# Patient Record
Sex: MALE | Race: WHITE | NOT HISPANIC OR LATINO | Employment: FULL TIME | URBAN - METROPOLITAN AREA
[De-identification: names, ages, dates, MRNs, and addresses within clinical notes are randomized per-mention and may not be internally consistent; named-entity substitution may affect disease eponyms.]

---

## 2017-07-30 ENCOUNTER — APPOINTMENT (OUTPATIENT)
Dept: LAB | Age: 52
End: 2017-07-30

## 2017-07-30 ENCOUNTER — TRANSCRIBE ORDERS (OUTPATIENT)
Dept: ADMINISTRATIVE | Age: 52
End: 2017-07-30

## 2017-07-30 DIAGNOSIS — Z00.8 HEALTH EXAMINATION IN POPULATION SURVEY: Primary | ICD-10-CM

## 2017-07-30 DIAGNOSIS — Z00.8 HEALTH EXAMINATION IN POPULATION SURVEY: ICD-10-CM

## 2017-07-30 LAB
CHOLEST SERPL-MCNC: 166 MG/DL (ref 50–200)
EST. AVERAGE GLUCOSE BLD GHB EST-MCNC: 120 MG/DL
HBA1C MFR BLD: 5.8 % (ref 4.2–6.3)
HDLC SERPL-MCNC: 57 MG/DL (ref 40–60)
LDLC SERPL CALC-MCNC: 97 MG/DL (ref 0–100)
TRIGL SERPL-MCNC: 62 MG/DL

## 2017-07-30 PROCEDURE — 83036 HEMOGLOBIN GLYCOSYLATED A1C: CPT

## 2017-07-30 PROCEDURE — 80061 LIPID PANEL: CPT

## 2017-07-30 PROCEDURE — 36415 COLL VENOUS BLD VENIPUNCTURE: CPT

## 2018-08-04 ENCOUNTER — APPOINTMENT (OUTPATIENT)
Dept: LAB | Age: 53
End: 2018-08-04

## 2018-08-04 ENCOUNTER — TRANSCRIBE ORDERS (OUTPATIENT)
Dept: ADMINISTRATIVE | Age: 53
End: 2018-08-04

## 2018-08-04 DIAGNOSIS — Z00.8 HEALTH EXAMINATION IN POPULATION SURVEY: Primary | ICD-10-CM

## 2018-08-04 DIAGNOSIS — Z00.8 HEALTH EXAMINATION IN POPULATION SURVEY: ICD-10-CM

## 2018-08-04 LAB
CHOLEST SERPL-MCNC: 165 MG/DL (ref 50–200)
EST. AVERAGE GLUCOSE BLD GHB EST-MCNC: 114 MG/DL
HBA1C MFR BLD: 5.6 % (ref 4.2–6.3)
HDLC SERPL-MCNC: 57 MG/DL (ref 40–60)
LDLC SERPL CALC-MCNC: 90 MG/DL (ref 0–100)
NONHDLC SERPL-MCNC: 108 MG/DL
TRIGL SERPL-MCNC: 88 MG/DL

## 2018-08-04 PROCEDURE — 36415 COLL VENOUS BLD VENIPUNCTURE: CPT

## 2018-08-04 PROCEDURE — 83036 HEMOGLOBIN GLYCOSYLATED A1C: CPT

## 2018-08-04 PROCEDURE — 80061 LIPID PANEL: CPT

## 2019-11-11 ENCOUNTER — APPOINTMENT (EMERGENCY)
Dept: RADIOLOGY | Facility: HOSPITAL | Age: 54
End: 2019-11-11
Payer: OTHER MISCELLANEOUS

## 2019-11-11 ENCOUNTER — HOSPITAL ENCOUNTER (EMERGENCY)
Facility: HOSPITAL | Age: 54
Discharge: HOME | End: 2019-11-11
Admitting: EMERGENCY MEDICINE
Payer: OTHER MISCELLANEOUS

## 2019-11-11 VITALS
RESPIRATION RATE: 14 BRPM | OXYGEN SATURATION: 99 % | SYSTOLIC BLOOD PRESSURE: 128 MMHG | WEIGHT: 185 LBS | HEIGHT: 73 IN | DIASTOLIC BLOOD PRESSURE: 69 MMHG | TEMPERATURE: 98.3 F | HEART RATE: 94 BPM | BODY MASS INDEX: 24.52 KG/M2

## 2019-11-11 DIAGNOSIS — S06.5X9A SDH (SUBDURAL HEMATOMA) (HCC): Primary | ICD-10-CM
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
## 2019-11-11 PROBLEM — S06.5XAA SDH (SUBDURAL HEMATOMA) (HCC): Status: ACTIVE | Noted: 2019-11-11

## 2019-11-11 LAB
BASE EXCESS BLDA CALC-SCNC: 5 MMOL/L (ref -2–3)
CA-I BLD-SCNC: 1.16 MMOL/L (ref 1.12–1.32)
GLUCOSE SERPL-MCNC: 100 MG/DL (ref 65–140)
HCO3 BLDA-SCNC: 29.7 MMOL/L (ref 24–30)
HCT VFR BLD CALC: 45 % (ref 36.5–49.3)
HGB BLDA-MCNC: 15.3 G/DL (ref 12–17)
PCO2 BLD: 31 MMOL/L (ref 21–32)
PCO2 BLD: 44 MM HG (ref 42–50)
PH BLD: 7.44 [PH] (ref 7.3–7.4)
PO2 BLD: 32 MM HG (ref 35–45)
POTASSIUM BLD-SCNC: 3.9 MMOL/L (ref 3.5–5.3)
SAO2 % BLD FROM PO2: 64 % (ref 60–85)
SODIUM BLD-SCNC: 139 MMOL/L (ref 136–145)
SPECIMEN SOURCE: ABNORMAL

## 2019-11-11 PROCEDURE — 82803 BLOOD GASES ANY COMBINATION: CPT

## 2019-11-11 PROCEDURE — 71045 X-RAY EXAM CHEST 1 VIEW: CPT

## 2019-11-11 PROCEDURE — 82947 ASSAY GLUCOSE BLOOD QUANT: CPT

## 2019-11-11 PROCEDURE — 85014 HEMATOCRIT: CPT

## 2019-11-11 PROCEDURE — 73130 X-RAY EXAM OF HAND: CPT

## 2019-11-11 PROCEDURE — 84295 ASSAY OF SERUM SODIUM: CPT

## 2019-11-11 PROCEDURE — 99284 EMERGENCY DEPT VISIT MOD MDM: CPT | Performed by: SURGERY

## 2019-11-11 PROCEDURE — NC001 PR NO CHARGE: Performed by: SURGERY

## 2019-11-11 PROCEDURE — 82330 ASSAY OF CALCIUM: CPT

## 2019-11-11 PROCEDURE — 99244 OFF/OP CNSLTJ NEW/EST MOD 40: CPT | Performed by: PHYSICIAN ASSISTANT

## 2019-11-11 PROCEDURE — 84132 ASSAY OF SERUM POTASSIUM: CPT

## 2019-11-11 PROCEDURE — NC001 PR NO CHARGE: Performed by: EMERGENCY MEDICINE

## 2019-11-11 PROCEDURE — 70450 CT HEAD/BRAIN W/O DYE: CPT

## 2019-11-11 PROCEDURE — 99285 EMERGENCY DEPT VISIT HI MDM: CPT

## 2019-11-11 RX ORDER — ACETAMINOPHEN 325 MG/1
975 TABLET ORAL EVERY 8 HOURS PRN
Status: DISCONTINUED | OUTPATIENT
Start: 2019-11-11 | End: 2019-11-11 | Stop reason: HOSPADM

## 2019-11-11 RX ORDER — ASPIRIN 81 MG/1
81 TABLET, CHEWABLE ORAL DAILY
COMMUNITY
End: 2019-11-11 | Stop reason: HOSPADM

## 2019-11-11 RX ORDER — METHOCARBAMOL 500 MG/1
500 TABLET, FILM COATED ORAL EVERY 6 HOURS PRN
Status: DISCONTINUED | OUTPATIENT
Start: 2019-11-11 | End: 2019-11-11 | Stop reason: HOSPADM

## 2019-11-11 RX ADMIN — ACETAMINOPHEN 975 MG: 325 TABLET ORAL at 11:10

## 2019-11-11 RX ADMIN — METHOCARBAMOL TABLETS 500 MG: 500 TABLET, COATED ORAL at 11:10
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
## 2019-11-11 NOTE — DISCHARGE INSTRUCTIONS
Discharge Instructions - Neurosurgery    · Do not take any blood thinning medications (ie  No Advil  No motrin  No ibuprofen  No Aleve  No Aspirin  No fishoil  No heparin  No antiplatelet / no anticoagulation medication)  - may resume aspirin after 2 weeks  · Refrain from activity that increases chance of trauma to head or falls  Recommend you take fall precaution  · No strenuous activity or sports  · Return to hospital Emergency Room if you experience worsening / new headache, nausea/vomiting, speech/vision change, seizure, confusion / mental status change, weakness, or other neurological changes
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
## 2019-11-11 NOTE — INCIDENTAL FINDINGS
The following findings require follow up:  Radiographic finding   Finding: There is polypoid mucosal thickening of the maxillary sinuses as well as ethmoid air cells  Follow up required: routine follow-up with PCP   Follow up should be done within 4-6 week(s)    Please notify the following clinician to assist with the follow up:    With your primary care provider

## 2019-11-13 ENCOUNTER — TELEPHONE (OUTPATIENT)
Dept: NEUROSURGERY | Facility: CLINIC | Age: 54
End: 2019-11-13
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
## 2019-11-19 ENCOUNTER — TELEPHONE (OUTPATIENT)
Dept: SURGERY | Facility: CLINIC | Age: 54
End: 2019-11-19
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
## 2019-11-20 ENCOUNTER — OFFICE VISIT (OUTPATIENT)
Dept: NEUROSURGERY | Facility: CLINIC | Age: 54
End: 2019-11-20
Payer: OTHER MISCELLANEOUS

## 2019-11-20 ENCOUNTER — HOSPITAL ENCOUNTER (OUTPATIENT)
Dept: CT IMAGING | Facility: HOSPITAL | Age: 54
Discharge: HOME | End: 2019-11-20
Payer: OTHER MISCELLANEOUS

## 2019-11-20 ENCOUNTER — TELEPHONE (OUTPATIENT)
Dept: NEUROSURGERY | Facility: CLINIC | Age: 54
End: 2019-11-20

## 2019-11-20 VITALS
HEART RATE: 81 BPM | SYSTOLIC BLOOD PRESSURE: 122 MMHG | BODY MASS INDEX: 25.39 KG/M2 | DIASTOLIC BLOOD PRESSURE: 88 MMHG | TEMPERATURE: 98.3 F | WEIGHT: 191.6 LBS | HEIGHT: 73 IN

## 2019-11-20 DIAGNOSIS — S06.5X9A SDH (SUBDURAL HEMATOMA) (HCC): ICD-10-CM

## 2019-11-20 DIAGNOSIS — S06.9X0S TRAUMATIC BRAIN INJURY, WITHOUT LOSS OF CONSCIOUSNESS, SEQUELA (HCC): ICD-10-CM
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
## 2019-11-20 DIAGNOSIS — S06.5X9A SDH (SUBDURAL HEMATOMA) (HCC): Primary | ICD-10-CM

## 2019-11-20 PROCEDURE — 70450 CT HEAD/BRAIN W/O DYE: CPT

## 2019-11-20 PROCEDURE — 99213 OFFICE O/P EST LOW 20 MIN: CPT | Performed by: PHYSICIAN ASSISTANT

## 2019-11-20 RX ORDER — ACETAMINOPHEN 325 MG/1
650 TABLET ORAL AS NEEDED
COMMUNITY

## 2019-11-20 RX ORDER — MULTIVITAMIN
1 TABLET ORAL DAILY
COMMUNITY
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
## 2019-11-20 NOTE — TELEPHONE ENCOUNTER
Called patients' wife explained the f/u plan  She stated he would be available to go anywhere for CT Head (preferred [facility] / [address] [place] [address])  Called central scheduling  Patient scheduled for 2:00pm today at [address] [place] [address] with a f/u in the office at 245pm with [address]  Rosanna Oro was extremely appreciative of the assistance
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
## 2019-11-26 ENCOUNTER — TELEPHONE (OUTPATIENT)
Dept: NEUROLOGY | Facility: CLINIC | Age: 54
End: 2019-11-26

## 2019-11-26 NOTE — TELEPHONE ENCOUNTER
Spoke to pt in reference to rescheduling his 12/11 appointment to 12/3  As per pt he wants to keep his appointment as is

## 2019-12-03 ENCOUNTER — TELEPHONE (OUTPATIENT)
Dept: NEUROLOGY | Facility: CLINIC | Age: 54
End: 2019-12-03

## 2019-12-10 ENCOUNTER — TELEPHONE (OUTPATIENT)
Dept: NEUROLOGY | Facility: CLINIC | Age: 54
End: 2019-12-10
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
## 2019-12-11 ENCOUNTER — CONSULT (OUTPATIENT)
Dept: NEUROLOGY | Facility: CLINIC | Age: 54
End: 2019-12-11
Payer: OTHER MISCELLANEOUS

## 2019-12-11 VITALS
DIASTOLIC BLOOD PRESSURE: 84 MMHG | HEIGHT: 73 IN | HEART RATE: 80 BPM | BODY MASS INDEX: 25.79 KG/M2 | RESPIRATION RATE: 16 BRPM | WEIGHT: 194.6 LBS | SYSTOLIC BLOOD PRESSURE: 129 MMHG
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
## 2019-12-11 DIAGNOSIS — S06.9X0D MILD TRAUMATIC BRAIN INJURY, WITHOUT LOSS OF CONSCIOUSNESS, SUBSEQUENT ENCOUNTER: Primary | ICD-10-CM

## 2019-12-11 DIAGNOSIS — G44.319 ACUTE POST-TRAUMATIC HEADACHE, NOT INTRACTABLE: ICD-10-CM

## 2019-12-11 DIAGNOSIS — S06.5X9A SDH (SUBDURAL HEMATOMA) (HCC): ICD-10-CM

## 2019-12-11 DIAGNOSIS — S04.32XA INJURY OF LEFT TRIGEMINAL NERVE, INITIAL ENCOUNTER: ICD-10-CM

## 2019-12-11 PROCEDURE — 99244 OFF/OP CNSLTJ NEW/EST MOD 40: CPT | Performed by: PSYCHIATRY & NEUROLOGY

## 2019-12-11 NOTE — PROGRESS NOTES
Review of Systems   Constitutional: Negative  Negative for appetite change and fever  HENT: Positive for tinnitus  Negative for hearing loss, trouble swallowing and voice change  Eyes: Positive for visual disturbance (sensitivity to light)  Negative for photophobia and pain  Respiratory: Negative  Negative for shortness of breath  Cardiovascular: Negative  Negative for palpitations  Gastrointestinal: Negative  Negative for nausea and vomiting  Endocrine: Negative  Negative for cold intolerance and heat intolerance  Genitourinary: Negative  Negative for dysuria, frequency and urgency  Musculoskeletal: Positive for gait problem (Balance issues)  Negative for myalgias and neck pain  Skin: Negative  Negative for rash  Allergic/Immunologic: Negative  Neurological: Positive for headaches (Pressure, feels like a hangover)  Negative for dizziness, tremors, seizures, syncope, facial asymmetry, speech difficulty, weakness, light-headedness and numbness  Memory issues   Hematological: Negative  Does not bruise/bleed easily  Psychiatric/Behavioral: Positive for agitation and confusion  Negative for hallucinations and sleep disturbance  The patient is nervous/anxious           Mood swings

## 2019-12-11 NOTE — PATIENT INSTRUCTIONS
Follow up with a primary doctor for routine health screen     Already returned to work without issues     Activity Plan:  Continue to gradually return to your normal physical activity    Headache/migraine treatment:   Abortive medications (for immediate treatment of a headache): Ok to take ibuprofen or acetaminophen for headaches, but try to limit the amount and frequency that you are taking to avoid medication overuse/rebound headache   - try to take no more than 3 days per week     Prescription preventive medications for headaches/migraines   (to take every day to help prevent headaches - not to take at the time of headache): Not indicated at this time     Lifestyle Recommendations:  - Maintain good sleep hygiene  Going to bed and waking up at consistent times, avoiding excessive daytime naps, avoiding caffeinated beverages in the evening, avoid excessive stimulation in the evening and generally using bed primarily for sleeping  One hour before bedtime would recommend turning lights down lower, decreasing your activity (may read quietly, listen to music at a low volume)  When you get into bed, should eliminate all technology (no texting, emailing, playing with your phone, iPad or tablet in bed)  - Maintain good hydration  Drink  2L of fluid a day (4 typical small water bottles)  - Maintain good nutrition  In particular don't skip meals and eat balanced meals regularly  Education and Follow-up  - Please contact us if any questions or concerns arise  Of course, try to protect yourself from head injuries, and if any new concerning symptoms or significant blow to the head or body go to the emergency department    - Follow up if needed

## 2021-01-18 PROBLEM — Z12.5 PROSTATE CANCER SCREENING ENCOUNTER, OPTIONS AND RISKS DISCUSSED: Status: ACTIVE | Noted: 2021-01-18
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
## 2021-01-18 NOTE — PATIENT INSTRUCTIONS
PROSTATE CANCER SCREENING OVERVIEW    Prostate cancer screening involves testing for prostate cancer in men who have no symptoms of the disease  This testing can find cancer at an early stage  However, medical experts agree that prostate cancer screening should not be routinely ordered for all men and that screening can lead to both benefits and harms  This article is designed to review the advantages and disadvantages of prostate cancer screening  You should talk with your health care provider to decide what is best in your individual situation  WHAT IS PROSTATE CANCER? Prostate cancer is a cancer of the prostate, a small gland in men that is located below the bladder and in front of the rectum (figure 1)  The prostate produces fluid that helps carry sperm during ejaculation  Although many men are diagnosed with prostate cancer, most of them do not die from their cancer  Prostate cancer often grows so slowly that many men die of other causes before they even develop symptoms of prostate cancer  PROSTATE CANCER RISK FACTORS  Age -- All men are at risk for prostate cancer, but the risk greatly increases with older age  Prostate cancer is rarely found in men younger than 48years old  Ethnic background --  men develop prostate cancer more often than white and  men   men also are more likely to die of prostate cancer than white or  men  Family medical history -- Men who have a first-degree relative (a father or brother) with prostate cancer are more likely to develop the disease  Men with female relatives with breast cancer related to the breast cancer gene (BRCA) may also be more likely to develop prostate cancer  Diet -- A diet high in animal fat or low in vegetables may increase a man's risk of prostate cancer      PROSTATE CANCER SCREENING TESTS  Prostate cancer screening involves blood test that measures prostate-specific antigen (PSA)  Prostate-specific antigen (PSA) -- PSA is a protein produced by the prostate  The PSA test measures the amount of PSA in a sample of blood  Although many men with prostate cancer have an elevated PSA concentration, a high level does not necessarily mean there is a cancer  The most common cause for an elevated PSA is benign prostatic hyperplasia (BPH), a noncancerous enlargement of the prostate  Other causes include prostate infection (prostatitis), trauma (bicycle riding), and sexual activity  You should avoid ejaculating or riding a bike for at least 48 hours before having a PSA test  (See "Patient education: Benign prostatic hyperplasia (BPH) (Beyond the Basics)"  )    Rectal examination -- A rectal examination is sometimes recommended, along with measurement of the PSA, to screen for prostate cancer  However, studies have not shown that rectal examination is an effective screening test for prostate cancer when used alone  If the PSA test is positive -- A positive PSA test is not a reason to panic; noncancerous conditions are the most common causes for an abnormal test, particularly for PSA tests  On the other hand, a positive test should not be ignored  The first step in evaluating an elevated PSA is usually to repeat the test   You should avoid ejaculating and riding a bike for at least 48 hours before repeating the test  If the PSA remains elevated, a prostate biopsy or other testing is usually recommended  Prostate biopsy -- A prostate biopsy involves having a rectal ultrasound and use of a needle to obtain tissue samples from the prostate gland  The biopsy is usually performed in the office by a urologist (a doctor who specializes in treatment of urinary, bladder, and prostate issues)  After the procedure, most men feel sore and you may see some blood in the urine or semen, this can last for up to 4-6 weeks  Biopsies can rarely cause serious infections   Sometimes biopsies are guided by magnetic resonance imaging (MRI)  PROS AND CONS OF PROSTATE CANCER SCREENING    There are a number of arguments for and against prostate cancer screening  Arguments for screening -- Experts in favor of prostate cancer screening cite the following arguments:    ?Results from a large  study of prostate cancer screening found that men who had prostate-specific antigen (PSA) testing had a 20 percent lower chance of dying from prostate cancer after 13 years compared with men who did not have prostate cancer screening [1]  Men who had PSA testing also had a 30 percent lower chance of developing metastatic disease (cancer that has spread to other parts of the body) [2]  In another  study of prostate cancer screening, the mortality benefit was even larger to prostate cancer screening  (the [place] trial)    ? A substantial number of men die from prostate cancer every year and many more suffer from the complications of advanced disease  ? For men with an aggressive prostate cancer, the best chance for curing it is by finding it at an early stage and then treating it with surgery or radiation  Studies have shown that men who have prostate cancer detected by PSA screening tend to have earlier-stage cancer than men who have a cancer detected by other means  (See "Patient education: Treatment for advanced prostate cancer (Beyond the Basics)" and "Patient education: Prostate cancer treatment; stage I to III cancer (Beyond the Basics)" )    ? The five-year survival for men who have prostate cancer confined to the prostate gland (early stage) is nearly 100 percent; this drops to 27 percent for men whose cancer has spread to other areas of the body  However, many early-stage cancers are not aggressive, and the five-year survival for those will be nearly 100 percent even without any treatment [3]  ?The available screening tests are not perfect, but they are easy to perform and have fair accuracy    Arguments against screening -- Other arguments have also been made against screening:    ?Even though the  study found a benefit of prostate cancer screening, PSA testing prevented only about one prostate cancer death for every 1000 screened men after 13 years [1]  Furthermore, 76 percent of men with an abnormal PSA who had a prostate biopsy did not have prostate cancer  However, in the [place] study, the number needed to screen and treat was much lower indicating that prostate cancer screening is ineffective screening measure which decreases the morbidity and mortality of prostate cancer  ?Many prostate cancers detected with screening are unlikely to cause death or disability  Thus, a number of men will be diagnosed with cancer and potentially suffer the side effects of cancer treatment for cancers that never would have been found without prostate cancer screening  In other words, even if screening finds a cancer early, it is not clear in all cases that the cancer must be treated  IS PROSTATE CANCER SCREENING RIGHT FOR ME? The answer to this question is not the same for everyone  The table includes some questions you can ask yourself when weighing the potential risk and benefits of screening (table 1)  Professional organizations -- Major medical associations and societies, including the [employer] Task Force [4], American Cancer Society [5], American Urological Association [6], and many  cancer societies, agree that men should discuss screening with their health care providers  Men should be informed about the benefits and risks of prostate cancer screening and treatment and make decisions that best reflect their personal values and preferences  Age to first consider screening -- Screening discussions should begin at age 48 years for men at average risk for developing prostate cancer   Men with risk factors for prostate cancer (such as black men or men with a father or brother who had prostate cancer) may want to begin screening discussions at age 36 to 39 years  How often to perform screening -- Once screening begins, it should occur every two to four years (if continued testing is desired) and should include a PSA blood test   Age to stop screening -- Guidelines suggest stopping screening after age 71, though some experts would continue offering screening to very healthy men beyond that age  Screening not recommended -- Screening is generally not recommended for men whose life expectancy, or ability to undergo curative treatment, is limited by serious health problems  In these situations, the potential benefits of screening are outweighed by the likely harms  WHERE TO GET MORE INFORMATION  Your healthcare provider is the best source of information for questions and concerns related to your medical problem  This article will be updated as needed on our web site (www [employer]/patients)  Related topics for patients, as well as selected articles written for healthcare professionals, are also available  Some of the most relevant are listed below  Patient level information -- [employer] offers two types of patient education materials  The Basics -- The Basics patient education pieces answer the four or five key questions a patient might have about a given condition  These articles are best for patients who want a general overview and who prefer short, easy-to-read materials  Patient education: Prostate cancer screening (PSA tests) (The Basics)  Patient education: Prostate cancer (The Basics)  Patient education: Cancer screening (The Basics)  Patient education: Choosing treatment for low-risk localized prostate cancer (The Basics)  Beyond the Basics -- Beyond the Basics patient education pieces are longer, more sophisticated, and more detailed  These articles are best for patients who want in-depth information and are comfortable with some medical jargon    Patient education: Treatment for advanced prostate cancer (Beyond the Basics)  Patient education: Prostate cancer treatment; stage I to III cancer (Beyond the Basics)  Patient education: Benign prostatic hyperplasia (BPH) (Beyond the Basics)  Professional level information -- Professional level articles are designed to keep doctors and other health professionals up-to-date on the latest medical findings  These articles are thorough, long, and complex, and they contain multiple references to the research on which they are based  Professional level articles are best for people who are comfortable with a lot of medical terminology and who want to read the same materials their doctors are reading  Measurement of prostate-specific antigen  Screening for prostate cancer  The following organizations also provide reliable health information  ? HANNA Martinez, Gisselle  0-772-5-CANCER  (www cancer gov/types/prostate)  ? American Society for Clinical Oncology (patient information website)  (www cancer  net)  ? [address] [place] [address] (patient and caregiver information website)  (www nccn org/patients)  ? [address]  4-179-MTQ-2345  ([facility]  org)  ? [employer] of Medicine  (www [employer] gov/healthtopics  html)  ? [employer] Prostate Cancer Education and Support  (www ustoo  org/Detection-PSA-And-ROSY)

## 2021-01-19 ENCOUNTER — LAB (OUTPATIENT)
Dept: LAB | Facility: AMBULARY SURGERY CENTER | Age: 56
End: 2021-01-19
Attending: UROLOGY
Payer: COMMERCIAL

## 2021-01-19 ENCOUNTER — TELEPHONE (OUTPATIENT)
Dept: UROLOGY | Facility: CLINIC | Age: 56
End: 2021-01-19

## 2021-01-19 ENCOUNTER — OFFICE VISIT (OUTPATIENT)
Dept: UROLOGY | Facility: CLINIC | Age: 56
End: 2021-01-19
Payer: COMMERCIAL

## 2021-01-19 VITALS
BODY MASS INDEX: 24.28 KG/M2 | DIASTOLIC BLOOD PRESSURE: 80 MMHG | WEIGHT: 183.2 LBS | HEIGHT: 73 IN | SYSTOLIC BLOOD PRESSURE: 128 MMHG | HEART RATE: 73 BPM

## 2021-01-19 DIAGNOSIS — Z12.5 PROSTATE CANCER SCREENING ENCOUNTER, OPTIONS AND RISKS DISCUSSED: Primary | ICD-10-CM

## 2021-01-19 DIAGNOSIS — R31.0 GROSS HEMATURIA: ICD-10-CM

## 2021-01-19 DIAGNOSIS — R39.9 LOWER URINARY TRACT SYMPTOMS (LUTS): ICD-10-CM

## 2021-01-19 DIAGNOSIS — I86.1 SCROTAL VARICES: ICD-10-CM

## 2021-01-19 LAB — PSA SERPL-MCNC: 1.5 NG/ML (ref 0–4)

## 2021-01-19 PROCEDURE — 99204 OFFICE O/P NEW MOD 45 MIN: CPT | Performed by: UROLOGY

## 2021-01-19 PROCEDURE — 84153 ASSAY OF PSA TOTAL: CPT

## 2021-01-19 RX ORDER — ASPIRIN 81 MG/1
81 TABLET ORAL DAILY
COMMUNITY

## 2021-01-19 NOTE — TELEPHONE ENCOUNTER
I did try to call the patient to give him his PSA results which have resulted at 1 5, low risk for prostate cancer combined with his examination  He did not answer the telephone  I asked him to please call our office should he wish to have these results  He can see us back on a p r n   Basis

## 2021-01-20 NOTE — TELEPHONE ENCOUNTER
Attempted to call patient at this time, no answer  Per communication consent detailed message left    Advised Dr Ofelia Hinton reviewed PSA , PSA resulted at 1 5 well within normal range, patient can follow up on an as needed basis unless he has any urological concerns or complaints   Left office number for call back

## 2021-07-07 ENCOUNTER — TELEPHONE (OUTPATIENT)
Dept: UROLOGY | Facility: AMBULATORY SURGERY CENTER | Age: 56
End: 2021-07-07

## 2021-07-07 NOTE — TELEPHONE ENCOUNTER
Patient managed by Lisa Ram is calling to say he was seen in emergency room yesterday and told he has a 5 mm stone

## 2021-07-07 NOTE — TELEPHONE ENCOUNTER
CT scan done 7/6/21 shows 5mm distal right ureter calculus  Please schedule patient for AP follow up within 7-10 days, can use same day/next day  He should bring disc with images from our of state CT with him to appt

## 2021-07-07 NOTE — TELEPHONE ENCOUNTER
Patient was seen in [place] ER at [facility]  Patient was advised to contact hospital for records   Patient will fax records over to [phone]

## 2021-07-08 NOTE — TELEPHONE ENCOUNTER
Patient requesting first opening as he is out of medications  Offered 07/09 with Mira Oliveros    Patient accepted

## 2022-10-12 PROBLEM — Z12.5 PROSTATE CANCER SCREENING ENCOUNTER, OPTIONS AND RISKS DISCUSSED: Status: RESOLVED | Noted: 2021-01-18 | Resolved: 2022-10-12

## 2023-05-22 PROBLEM — G47.33 OSA (OBSTRUCTIVE SLEEP APNEA): Status: ACTIVE | Noted: 2023-05-22
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
## 2025-06-23 ENCOUNTER — TELEPHONE (OUTPATIENT)
Dept: GASTROENTEROLOGY | Facility: CLINIC | Age: 60
End: 2025-06-23

## 2025-06-23 ENCOUNTER — OFFICE VISIT (OUTPATIENT)
Dept: GASTROENTEROLOGY | Facility: CLINIC | Age: 60
End: 2025-06-23
Payer: COMMERCIAL

## 2025-06-23 VITALS
WEIGHT: 174.4 LBS | HEART RATE: 75 BPM | BODY MASS INDEX: 23.11 KG/M2 | HEIGHT: 73 IN | DIASTOLIC BLOOD PRESSURE: 67 MMHG | SYSTOLIC BLOOD PRESSURE: 103 MMHG

## 2025-06-23 DIAGNOSIS — R14.0 BLOATING: ICD-10-CM

## 2025-06-23 DIAGNOSIS — R17 SERUM TOTAL BILIRUBIN ELEVATED: ICD-10-CM

## 2025-06-23 DIAGNOSIS — R10.12 LUQ PAIN: ICD-10-CM

## 2025-06-23 DIAGNOSIS — R10.13 EPIGASTRIC PAIN: ICD-10-CM

## 2025-06-23 DIAGNOSIS — R19.4 CHANGE IN BOWEL HABIT: Primary | ICD-10-CM

## 2025-06-23 DIAGNOSIS — R63.4 WEIGHT LOSS, ABNORMAL: ICD-10-CM

## 2025-06-23 PROCEDURE — 99204 OFFICE O/P NEW MOD 45 MIN: CPT | Performed by: NURSE PRACTITIONER

## 2025-06-23 RX ORDER — SODIUM CHLORIDE, SODIUM LACTATE, POTASSIUM CHLORIDE, CALCIUM CHLORIDE 600; 310; 30; 20 MG/100ML; MG/100ML; MG/100ML; MG/100ML
125 INJECTION, SOLUTION INTRAVENOUS CONTINUOUS
OUTPATIENT
Start: 2025-06-23

## 2025-06-23 RX ORDER — TADALAFIL 5 MG/1
5 TABLET ORAL DAILY PRN
COMMUNITY
Start: 2025-04-03 | End: 2025-08-01

## 2025-06-23 RX ORDER — PANTOPRAZOLE SODIUM 20 MG/1
20 TABLET, DELAYED RELEASE ORAL DAILY
Qty: 30 TABLET | Refills: 1 | Status: SHIPPED | OUTPATIENT
Start: 2025-06-23

## 2025-06-23 NOTE — PROGRESS NOTES
Name: Nick Call      : 1965      MRN: [id number]  Encounter Provider: ESPERANZA Morgan  Encounter Date: 2025   Encounter department: [facility] GASTROENTEROLOGY [place] [address]  :  Assessment & Plan  Change in bowel habit  Bloating  Weight loss, abnormal  Epigastric pain  LUQ pain  Pt reports diarrhea with small quantities of loose stool several times a day, bloating/flatulence, weight loss of 10 lbs over the last 5-6 weeks and more recently has been having epigastric/left upper quadrant pain as well.  Denies any NSAID use except for daily aspirin or recent antibiotic use. Comprehensive stool panel done by PCP  was negative.  Recent CBC normal, CMP notable for just elevated total bilirubin 1.1, TSH done back in February was normal.  His last colonoscopy was 30+ years ago and he thinks this was normal except for hemorrhoids.    -Obtain x-ray KUB to evaluate for overflow diarrhea given moderate stool burden on prior CAT scan in April  -Check fecal elastase, fecal calprotectin, celiac disease panel  -Recommend continuing Metamucil fiber supplement which she started yesterday  -Continue bland diet, stay well-hydrated  - EGD/colonoscopy scheduled for further evaluation.  Prep and procedure explained  -Will start low-dose PPI with Protonix 20 mg daily in the interim due to epigastric/left upper quadrant pain to treat any underlying gastritis/PUD  - Consider contrast-enhanced CAT scan abdomen pelvis pending course          Orders:    Colonoscopy; Future    EGD; Future    XR abdomen 1 view kub; Future    Celiac Panel/Adult; Future    Pancreatic elastase, fecal; Future    Calprotectin,Fecal; Future    pantoprazole (PROTONIX) 20 mg tablet; Take 1 tablet (20 mg total) by mouth daily 1/2 hour before breakfast    Serum total bilirubin elevated  Patient with persistent total bilirubin elevation 1.1-1.5 on labs going back to 2023   Will obtain hepatic function panel to  "differentiate direct versus indirect elevation   Consider right upper quadrant ultrasound pending course    Orders:    Hepatic function panel; Future        History of Present Illness   Nick Call is a 60 y.o. male with history of MI 2022 now off Plavix, SDH 2019, WILLIS, bladder stone s/p cysto who presents to establish care due to diarrhea with associated bloating/flatulence, and unintentional weight loss of 10 pounds.  He has also been having intermittent epigastric/left upper quadrant pain as well.  Reports symptoms started 5 to 6 weeks ago and he had a comprehensive stool panel done by PCP which was unremarkable.  He denies any new medications except for Myrbetriq but discontinued this and diarrhea persisted.  Last CBC at the end of May was normal, CMP in April showed persistently elevated total bilirubin 1.1.    He had prior CT renal study back in April showing nonobstructing bilateral renal calculi, bladder calculi measuring 2.1 cm, and moderate stool in the colon.      He reports his last colonoscopy was 30+ years ago which he thinks was normal.  Denies any known family history of colon cancer.    Works as a     HPI    Review of Systems A complete review of systems is negative other than that noted above in the HPI.         Objective   /67 (Patient Position: Sitting, Cuff Size: Standard)   Pulse 75   Ht 6' 1\" (1.854 m)   Wt 79.1 kg (174 lb 6.4 oz)   BMI 23.01 kg/m²     Physical Exam  Vitals and nursing note reviewed.   Constitutional:       General: He is not in acute distress.     Appearance: He is well-developed.   HENT:      Head: Normocephalic and atraumatic.     Eyes:      Conjunctiva/sclera: Conjunctivae normal.       Cardiovascular:      Rate and Rhythm: Normal rate and regular rhythm.      Heart sounds: No murmur heard.  Pulmonary:      Effort: Pulmonary effort is normal. No respiratory distress.      Breath sounds: Normal breath sounds.   Abdominal:      Palpations: Abdomen is " soft.      Tenderness: There is abdominal tenderness in the epigastric area and left upper quadrant.     Musculoskeletal:         General: No swelling.      Cervical back: Neck supple.     Skin:     General: Skin is warm and dry.      Capillary Refill: Capillary refill takes less than 2 seconds.     Neurological:      Mental Status: He is alert.     Psychiatric:         Mood and Affect: Mood normal.            Lab Results: I personally reviewed relevant lab results.

## 2025-06-23 NOTE — TELEPHONE ENCOUNTER
Scheduled date of EGD/colonoscopy (as of today): 8/25/25   Physician performing EGD/colonoscopy: Dr Flores  Location of EGD/colonoscopy: [facility]  Desired bowel prep reviewed with patient: Golytely given at appt   Instructions reviewed with patient by:  teetee  Clearances:  n/a

## 2025-06-28 ENCOUNTER — APPOINTMENT (OUTPATIENT)
Dept: RADIOLOGY | Age: 60
End: 2025-06-28
Payer: COMMERCIAL

## 2025-06-28 DIAGNOSIS — R19.4 CHANGE IN BOWEL HABIT: ICD-10-CM

## 2025-06-28 PROCEDURE — 74018 RADEX ABDOMEN 1 VIEW: CPT

## 2025-06-29 ENCOUNTER — APPOINTMENT (OUTPATIENT)
Dept: LAB | Age: 60
End: 2025-06-29
Payer: COMMERCIAL

## 2025-06-29 DIAGNOSIS — R63.4 WEIGHT LOSS, ABNORMAL: ICD-10-CM

## 2025-06-29 DIAGNOSIS — R19.4 CHANGE IN BOWEL HABIT: ICD-10-CM

## 2025-06-29 DIAGNOSIS — R14.0 BLOATING: ICD-10-CM

## 2025-06-29 DIAGNOSIS — R17 SERUM TOTAL BILIRUBIN ELEVATED: ICD-10-CM

## 2025-06-29 LAB
ALBUMIN SERPL BCG-MCNC: 4.5 G/DL (ref 3.5–5)
ALP SERPL-CCNC: 57 U/L (ref 34–104)
ALT SERPL W P-5'-P-CCNC: 17 U/L (ref 7–52)
AST SERPL W P-5'-P-CCNC: 21 U/L (ref 13–39)
BILIRUB DIRECT SERPL-MCNC: 0.24 MG/DL (ref 0–0.2)
BILIRUB SERPL-MCNC: 1.09 MG/DL (ref 0.2–1)
IGA SERPL-MCNC: 132 MG/DL (ref 66–433)
PROT SERPL-MCNC: 6.7 G/DL (ref 6.4–8.4)

## 2025-06-29 PROCEDURE — 82784 ASSAY IGA/IGD/IGG/IGM EACH: CPT

## 2025-06-29 PROCEDURE — 80076 HEPATIC FUNCTION PANEL: CPT

## 2025-06-29 PROCEDURE — 86364 TISS TRNSGLTMNASE EA IG CLAS: CPT

## 2025-06-29 PROCEDURE — 36415 COLL VENOUS BLD VENIPUNCTURE: CPT

## 2025-07-01 ENCOUNTER — RESULTS FOLLOW-UP (OUTPATIENT)
Dept: GASTROENTEROLOGY | Facility: CLINIC | Age: 60
End: 2025-07-01

## 2025-07-02 ENCOUNTER — TELEPHONE (OUTPATIENT)
Age: 60
End: 2025-07-02

## 2025-07-02 NOTE — TELEPHONE ENCOUNTER
New Patient      Insurance   Current Insurance?[employer]   Insurance E-verified? Yes    History   Reason for appointment/active symptoms?  BPH and THU     Has the patient had any previous Urologist(s)? Yes [facility] current     Was the patient seen in the ED? Yes 4/25 bladder stone     L[facility]     Records Requested? Yes  Records Visible in EPIC? Yes     Personal history of cancer? No     Appointment   Office location preference:  [facility]  ?   Appointment Details   Date:  8/13  Time:  7:20   Location:  [place]  Provider:  Dori Rico  Does the appointment need further review? Yes - this is THU from [facility] patient is very unhappy with care there for several years - he is a bit complex but all records are in his chart. Wife works for [employer] and finally called us for appointment. He would really prefer [place] as he works near there, but is OK with [place]. Also looking for a sooner appointment if possible.

## 2025-07-03 ENCOUNTER — APPOINTMENT (OUTPATIENT)
Dept: LAB | Facility: MEDICAL CENTER | Age: 60
End: 2025-07-03
Attending: NURSE PRACTITIONER
Payer: COMMERCIAL

## 2025-07-03 DIAGNOSIS — R63.4 WEIGHT LOSS, ABNORMAL: ICD-10-CM

## 2025-07-03 DIAGNOSIS — R14.0 BLOATING: ICD-10-CM

## 2025-07-03 DIAGNOSIS — R19.4 CHANGE IN BOWEL HABIT: ICD-10-CM

## 2025-07-03 PROCEDURE — 83993 ASSAY FOR CALPROTECTIN FECAL: CPT

## 2025-07-03 PROCEDURE — 82653 EL-1 FECAL QUANTITATIVE: CPT

## 2025-07-03 NOTE — TELEPHONE ENCOUNTER
Patient returned called and is aware of all the results.  Stool samples were dropped off today also.

## 2025-07-04 LAB
CALPROTECTIN STL-MCNC: 66.4 ÂΜG/G
ELASTASE PANC STL-MCNT: 312 UG/G

## 2025-07-12 LAB — TTG IGA SER IA-ACNC: <0.4 U/ML (ref ?–10)

## 2025-07-14 NOTE — PROGRESS NOTES
Name: Nick Call      : 1965      MRN: [id number]  Encounter Provider: ESPERANZA Robles  Encounter Date: 2025   Encounter department: [facility] [place]  :  Assessment & Plan  Benign prostatic hyperplasia with nocturia  PVR 49 mL performed today in office  Urine dip demonstrated large leukocyte esterase. Will send out for micro/culture.     Patient stopped Myrbetriq 50 mg due to constipation. He is following with GI.     Recommend alfuzosin 10 mg tablet for urinary symptoms.  Reviewed side effects including retrograde ejaculation and postural hypotension.    Continue daily Cialis 5 mg tablet.    Recommend diet and lifestyle modifications including to avoid bladder irritants and limit fluid consumption 2 hours prior to bedtime.    Reviewed management options including continued medication management versus surgical intervention.  Patient is interested in bladder outlet obstruction surgery.  Recommend follow-up with physician with a cystoscopy to further discuss      Orders:    POCT Measure PVR    POCT urine dip    Cytology, urine    alfuzosin (UROXATRAL) 10 mg 24 hr tablet; Take 1 tablet (10 mg total) by mouth daily    Urinalysis with microscopic    Urine culture    Screening for prostate cancer  PSA on 7/3/2025 was 2.45  PSA on 2024 was 2.24  PSA on 2021 was 1.5    Reviewed PSA, PSA is stable.  Repeat PSA in 1 year.    Gross hematuria  Isolated gross hematuria on 25    Reviewed utility of gross hematuria work up. Patient wishes for the work up. Recommend CT urogram, urine cytology and cystoscopy for gross hematuria and bladder outlet obstruction workup.    Orders:    CT renal protocol; Future    Nephrolithiasis  CT renal stone study 25 demonstrated non obstructing b/l renal calculi measuring up to 4 mm.   Patient is asymptomatic.   Reviewed the importance of hydration with 64 ounces of water each day.           History of Present Illness   Nick Call  is a 60 y.o. male who presents today as a transfer of care from [facility] urology.  Patient has a history of BPH with lower urinary tract symptoms and erectile dysfunction.  Patient was recently admitted to [facility] on 4/7/2025 for gross hematuria and testicular pain status post cystolitholapaxy 2.1 cm bladder stone that was found on imaging.    Patient has tried Flomax in the past for BPH however it caused urgency and dribbling.  He was recently started on Myrbetriq 50 mg daily on 6/30/2025.      Patient was scheduled for aqua ablation/possible TURP with [facility] urology but has requested for a second opinion.    Patient continues with ongoing urinary symptoms such as weak urinary stream, urgency with urge incontinence, nocturia 1-3 X, daytime frequency (every 1 hour), and feelings of incomplete bladder emptying.  He mainly drinks water during the day with occasional cup of tea.  He wears about 1 pad per day.    Patient denies any further episodes of gross hematuria.  Denies smoking history.  Denies family history of urothelial cancers.  Denies daily chemical exposure.    Review of Systems   Constitutional:  Negative for chills and fever.   Genitourinary:  Positive for frequency and urgency. Negative for difficulty urinating, dysuria, flank pain and hematuria.        Nocturia 1-3   Musculoskeletal:  Negative for back pain.   Skin:  Negative for wound.          Objective   There were no vitals taken for this visit.    Physical Exam  Constitutional:       Appearance: Normal appearance.   HENT:      Head: Normocephalic and atraumatic.   Pulmonary:      Effort: Pulmonary effort is normal.     Skin:     General: Skin is warm.     Neurological:      General: No focal deficit present.      Mental Status: He is alert.      Gait: Gait normal.     Psychiatric:         Mood and Affect: Mood normal.         Results   Lab Results   Component Value Date    PSA 1.5 01/19/2021     Lab Results   Component Value Date    GLUCOSE 100 11/11/2019     CALCIUM 9.4 05/22/2025    K 3.9 05/22/2025    CO2 32 (H) 05/22/2025     05/22/2025    BUN 22 05/22/2025    CREATININE 1.04 05/22/2025     Lab Results   Component Value Date    HGB 15.3 11/11/2019    HCT 45 11/11/2019       Office Urine Dip  No results found for this or any previous visit (from the past hour).

## 2025-07-15 DIAGNOSIS — R10.12 LUQ PAIN: ICD-10-CM

## 2025-07-15 DIAGNOSIS — R10.13 EPIGASTRIC PAIN: ICD-10-CM

## 2025-07-16 RX ORDER — PANTOPRAZOLE SODIUM 20 MG/1
20 TABLET, DELAYED RELEASE ORAL DAILY
Qty: 90 TABLET | Refills: 1 | Status: SHIPPED | OUTPATIENT
Start: 2025-07-16

## 2025-07-18 RX ORDER — MIRABEGRON 50 MG/1
50 TABLET, FILM COATED, EXTENDED RELEASE ORAL DAILY
COMMUNITY
Start: 2025-06-30

## 2025-07-20 NOTE — ASSESSMENT & PLAN NOTE
Isolated gross hematuria on 4/7/25    Reviewed utility of gross hematuria work up. Patient wishes for the work up. Recommend CT urogram, urine cytology and cystoscopy for gross hematuria and bladder outlet obstruction workup.    Orders:    CT renal protocol; Future

## 2025-07-21 ENCOUNTER — OFFICE VISIT (OUTPATIENT)
Dept: UROLOGY | Facility: AMBULATORY SURGERY CENTER | Age: 60
End: 2025-07-21
Payer: COMMERCIAL

## 2025-07-21 VITALS
HEART RATE: 75 BPM | DIASTOLIC BLOOD PRESSURE: 74 MMHG | WEIGHT: 174 LBS | BODY MASS INDEX: 23.06 KG/M2 | HEIGHT: 73 IN | SYSTOLIC BLOOD PRESSURE: 114 MMHG | OXYGEN SATURATION: 96 %

## 2025-07-21 DIAGNOSIS — R35.1 BENIGN PROSTATIC HYPERPLASIA WITH NOCTURIA: Primary | ICD-10-CM

## 2025-07-21 DIAGNOSIS — R31.0 GROSS HEMATURIA: ICD-10-CM

## 2025-07-21 DIAGNOSIS — N20.0 NEPHROLITHIASIS: ICD-10-CM

## 2025-07-21 DIAGNOSIS — Z12.5 SCREENING FOR PROSTATE CANCER: ICD-10-CM

## 2025-07-21 DIAGNOSIS — N40.1 BENIGN PROSTATIC HYPERPLASIA WITH NOCTURIA: Primary | ICD-10-CM

## 2025-07-21 LAB
BACTERIA UR QL AUTO: ABNORMAL /HPF
BILIRUB UR QL STRIP: NEGATIVE
CLARITY UR: CLEAR
COLOR UR: ABNORMAL
GLUCOSE UR STRIP-MCNC: NEGATIVE MG/DL
HGB UR QL STRIP.AUTO: NEGATIVE
KETONES UR STRIP-MCNC: NEGATIVE MG/DL
LEUKOCYTE ESTERASE UR QL STRIP: ABNORMAL
NITRITE UR QL STRIP: NEGATIVE
NON-SQ EPI CELLS URNS QL MICRO: ABNORMAL /HPF
PH UR STRIP.AUTO: 6 [PH]
POST-VOID RESIDUAL VOLUME, ML POC: 49 ML
PROT UR STRIP-MCNC: NEGATIVE MG/DL
RBC #/AREA URNS AUTO: ABNORMAL /HPF
SL AMB  POCT GLUCOSE, UA: NORMAL
SL AMB LEUKOCYTE ESTERASE,UA: NORMAL
SL AMB POCT BILIRUBIN,UA: NORMAL
SL AMB POCT BLOOD,UA: NORMAL
SL AMB POCT CLARITY,UA: CLEAR
SL AMB POCT COLOR,UA: YELLOW
SL AMB POCT KETONES,UA: NORMAL
SL AMB POCT NITRITE,UA: NORMAL
SL AMB POCT PH,UA: 6
SL AMB POCT SPECIFIC GRAVITY,UA: 1.01
SL AMB POCT URINE PROTEIN: NORMAL
SL AMB POCT UROBILINOGEN: 0.2
SP GR UR STRIP.AUTO: 1.02 (ref 1–1.03)
UROBILINOGEN UR STRIP-ACNC: <2 MG/DL
WBC #/AREA URNS AUTO: ABNORMAL /HPF

## 2025-07-21 PROCEDURE — 81001 URINALYSIS AUTO W/SCOPE: CPT

## 2025-07-21 PROCEDURE — 51798 US URINE CAPACITY MEASURE: CPT

## 2025-07-21 PROCEDURE — 88112 CYTOPATH CELL ENHANCE TECH: CPT | Performed by: PATHOLOGY

## 2025-07-21 PROCEDURE — 81002 URINALYSIS NONAUTO W/O SCOPE: CPT

## 2025-07-21 PROCEDURE — 87086 URINE CULTURE/COLONY COUNT: CPT

## 2025-07-21 PROCEDURE — 99204 OFFICE O/P NEW MOD 45 MIN: CPT

## 2025-07-21 RX ORDER — ALFUZOSIN HYDROCHLORIDE 10 MG/1
10 TABLET, EXTENDED RELEASE ORAL DAILY
Qty: 60 TABLET | Refills: 3 | Status: SHIPPED | OUTPATIENT
Start: 2025-07-21

## 2025-07-22 LAB — BACTERIA UR CULT: NORMAL

## 2025-07-23 ENCOUNTER — RESULTS FOLLOW-UP (OUTPATIENT)
Dept: UROLOGY | Facility: AMBULATORY SURGERY CENTER | Age: 60
End: 2025-07-23

## 2025-07-23 NOTE — TELEPHONE ENCOUNTER
----- Message from ESPERANZA Robles sent at 7/23/2025  9:11 AM EDT -----  Please let patient know his urine culture was negative for infection.    Urine cytology is pending.    Thank you.  ----- Message -----  From: Lottie Ramirez  Sent: 7/21/2025   7:28 AM EDT  To: ESPERANZA Robles

## 2025-07-23 NOTE — TELEPHONE ENCOUNTER
Called and spoke directly to this pt and relayed this message. Pt stayed he understood the message relayed. If this pt calls back please relay these messages again. Thank you            ----- Message from ESPERANZA Robles sent at 7/23/2025  9:11 AM EDT -----  Please let patient know his urine culture was negative for infection.    Urine cytology is pending.    Thank you.  ----- Message -----  From: Lottie Ramirez  Sent: 7/21/2025   7:28 AM EDT  To: ESPERANZA Robles

## 2025-07-28 NOTE — TELEPHONE ENCOUNTER
Called and left DVM for patient and call back number if patient calls back please relay information.     ----- Message from ESPERANZA Robles sent at 7/28/2025 10:00 AM EDT -----  Please let patient know we reviewed his urine cytology.  His urine cytology was negative for high-grade urothelial carcinoma.    Continue with cystoscopy and CT urogram as previously planned.    Thank you.  ----- Message -----  From: Lottie Ramirez  Sent: 7/21/2025   7:28 AM EDT  To: ESPERANZA Robles

## 2025-07-29 NOTE — TELEPHONE ENCOUNTER
Called and left DVM going over note from AP.If patient calls back please relay.    ----- Message ESPERANZA Robles sent at 7/28/2025 10:00 AM EDT -----  Please let patient know we reviewed his urine cytology.  His urine cytology was negative for high-grade urothelial carcinoma.    Continue with cystoscopy and CT urogram as previously planned.    Thank you.  ----- Message -----  From: Lottie Ramirez  Sent: 7/21/2025   7:28 AM EDT  To: ESPERANZA Robles

## 2025-08-06 ENCOUNTER — HOSPITAL ENCOUNTER (OUTPATIENT)
Dept: CT IMAGING | Facility: HOSPITAL | Age: 60
Discharge: HOME | End: 2025-08-06
Payer: COMMERCIAL

## 2025-08-06 DIAGNOSIS — R31.0 GROSS HEMATURIA: ICD-10-CM

## 2025-08-06 PROCEDURE — 74178 CT ABD&PLV WO CNTR FLWD CNTR: CPT

## 2025-08-06 RX ADMIN — IOHEXOL 100 ML: 350 INJECTION, SOLUTION INTRAVENOUS at 18:25

## 2025-08-19 ENCOUNTER — TELEPHONE (OUTPATIENT)
Age: 60
End: 2025-08-19

## 2025-08-19 DIAGNOSIS — R19.4 CHANGE IN BOWEL HABIT: Primary | ICD-10-CM
